# Patient Record
Sex: FEMALE | Race: BLACK OR AFRICAN AMERICAN | NOT HISPANIC OR LATINO | Employment: FULL TIME | URBAN - METROPOLITAN AREA
[De-identification: names, ages, dates, MRNs, and addresses within clinical notes are randomized per-mention and may not be internally consistent; named-entity substitution may affect disease eponyms.]

---

## 2023-11-22 ENCOUNTER — TRANSFERRED RECORDS (OUTPATIENT)
Dept: HEALTH INFORMATION MANAGEMENT | Facility: CLINIC | Age: 38
End: 2023-11-22

## 2023-11-24 ENCOUNTER — TRANSCRIBE ORDERS (OUTPATIENT)
Dept: OTHER | Age: 38
End: 2023-11-24

## 2023-11-24 DIAGNOSIS — H00.11 CHALAZION OF RIGHT UPPER EYELID: Primary | ICD-10-CM

## 2024-02-26 ENCOUNTER — TELEPHONE (OUTPATIENT)
Dept: OPHTHALMOLOGY | Facility: CLINIC | Age: 39
End: 2024-02-26
Payer: COMMERCIAL

## 2024-02-26 NOTE — TELEPHONE ENCOUNTER
FUTURE VISIT INFORMATION      FUTURE VISIT INFORMATION:  Date: 3/4/24  Time: 8:45am  [facility]: [facility]  REFERRAL INFORMATION:  Referring provider:  Nohemi Tinoco OD   Referring providers clinic:  [facility]   Reason for visit/diagnosis  Chalazion of right upper     RECORDS REQUESTED FROM:       Clinic name Comments Records Status Imaging Status   [facility] Request for recs sent 2/26- resent 3/1, 3/4- received and sent to scanning and emailed to [employer]

## 2024-02-29 ENCOUNTER — TELEPHONE (OUTPATIENT)
Dept: OPHTHALMOLOGY | Facility: CLINIC | Age: 39
End: 2024-02-29
Payer: COMMERCIAL

## 2024-03-05 ENCOUNTER — PRE VISIT (OUTPATIENT)
Dept: OPHTHALMOLOGY | Facility: CLINIC | Age: 39
End: 2024-03-05

## 2024-03-05 ENCOUNTER — OFFICE VISIT (OUTPATIENT)
Dept: OPHTHALMOLOGY | Facility: CLINIC | Age: 39
End: 2024-03-05
Attending: OPTOMETRIST
Payer: COMMERCIAL

## 2024-03-05 DIAGNOSIS — H00.11 CHALAZION OF RIGHT UPPER EYELID: ICD-10-CM

## 2024-03-05 PROCEDURE — 67800 REMOVE EYELID LESION: CPT | Mod: E3 | Performed by: OPHTHALMOLOGY

## 2024-03-05 PROCEDURE — 99207 PR BUNDLED PROCEDURE IN GLOBAL PKG: CPT | Performed by: OPHTHALMOLOGY

## 2024-03-05 RX ORDER — ERYTHROMYCIN 5 MG/G
OINTMENT OPHTHALMIC ONCE
Status: COMPLETED | OUTPATIENT
Start: 2024-03-05 | End: 2024-03-05

## 2024-03-05 RX ADMIN — ERYTHROMYCIN 1 G: 5 OINTMENT OPHTHALMIC at 09:52

## 2024-03-05 ASSESSMENT — CONF VISUAL FIELD
OD_NORMAL: 1
OD_SUPERIOR_TEMPORAL_RESTRICTION: 0
OD_INFERIOR_TEMPORAL_RESTRICTION: 0
OS_NORMAL: 1
OD_SUPERIOR_NASAL_RESTRICTION: 0
OS_INFERIOR_TEMPORAL_RESTRICTION: 0
OS_SUPERIOR_TEMPORAL_RESTRICTION: 0
OS_SUPERIOR_NASAL_RESTRICTION: 0
METHOD: COUNTING FINGERS
OD_INFERIOR_NASAL_RESTRICTION: 0
OS_INFERIOR_NASAL_RESTRICTION: 0

## 2024-03-05 ASSESSMENT — REFRACTION_WEARINGRX
OD_SPHERE: -6.00
SPECS_TYPE: SVL
OS_SPHERE: -2.75
OD_AXIS: 069
OS_AXIS: 094
OD_CYLINDER: +2.00
OS_CYLINDER: +1.25

## 2024-03-05 ASSESSMENT — TONOMETRY
IOP_METHOD: ICARE
OD_IOP_MMHG: 13
OS_IOP_MMHG: 12

## 2024-03-05 ASSESSMENT — VISUAL ACUITY
OD_CC+: -2
CORRECTION_TYPE: GLASSES
METHOD: SNELLEN - LINEAR
OD_CC: 20/40
OS_CC: 20/20

## 2024-03-05 ASSESSMENT — EXTERNAL EXAM - RIGHT EYE: OD_EXAM: NORMAL

## 2024-03-05 ASSESSMENT — SLIT LAMP EXAM - LIDS: COMMENTS: NORMAL

## 2024-03-05 ASSESSMENT — EXTERNAL EXAM - LEFT EYE: OS_EXAM: NORMAL

## 2024-03-05 NOTE — NURSING NOTE
Chief Complaints and History of Present Illnesses   Patient presents with    Chalazion Evaluation     Chief Complaint(s) and History of Present Illness(es)       Chalazion Evaluation              Laterality: right upper lid    Onset: 7 months ago    Course: stable    Associated symptoms: lid swelling.  Negative for red eye and eye pain    Treatments tried: warm compresses    Response to treatment: no improvement    Pain scale: 0/10              Comments    Patient has had chalazion right upper lid.  Warm compresses have not provided any benefit.      Danya Zaragoza on 3/5/2024 at 8:39 AM

## 2024-03-05 NOTE — PROGRESS NOTES
Oculoplastic Clinic New Patient    Patient: Malinda Luna MRN# [id number]   YOB: 1985 Age: 38 year old   Date of Visit: Mar 5, 2024         CC: Eyelid lesion    Chief Complaint(s) and History of Present Illness(es)     Chalazion Evaluation            Laterality: right upper lid    Onset: 7 months ago    Course: stable    Associated symptoms: lid swelling.  Negative for red eye and eye pain    Treatments tried: warm compresses    Response to treatment: no improvement    Pain scale: 0/10          Comments    Malinda Luna is being seen for a consult today by the request of Dr. Tinoco for chalazion chalazion right upper lid.  Warm compresses have not   provided any benefit.      Danya Zaragoza on 3/5/2024 at 8:39 AM            HPI:     Malinda Luna is a 38 year old female who has noted a lesion on the right upper eyelid. It has been present for 7 months. This has not been biopsied.    Enlarging: Yes  Irritating to eyelashes and catches in folds of skin: Yes  Bleeding: No  Prior cutaneous malignancy: No  Immunosuppression: No           Assessment and Plan:   1. right upper eyelid lesion - chalazion    She would like it drained, r/b/a to I&D discussed.        Ash Amador MD  Resident Physician, PGY-2  Department of Ophthalmology      Attending Physician Attestation: Complete documentation of historical and exam elements from today's encounter can be found in the full encounter summary report (not reduplicated in this progress note). I personally obtained the chief complaint(s) and history of present illness. I confirmed and edited as necessary the review of systems, past medical/surgical history, family history, social history, and examination findings as documented by others; and I examined the patient myself. I personally reviewed the relevant tests, images, and reports as documented above. I formulated and edited as necessary the assessment and plan and discussed the findings and  management plan with the patient. Delfina Elena MD    OPERATIVE NOTE: CHALAZION.    PRE-OPERATIVE DIAGNOSIS: Chalazion right upper lid.    POST-OPERATIVE DIAGNOSIS: Chalazion right upper lid.    PROCEDURE PERFORMED: Incision and drainage of chalazion right upper lid.    SURGEON: Delfina Elena    ASSISTANT: Beau Amador MD     ANESTHESIA: Local infiltration with 2% lidocaine with epinephrine.    COMPLICATIONS: None    ESTIMATED BLOOD LOSS:  <5mL    HISTORY AND INDICATIONS: Patient presented with an enlarging chalazion in the involved eyelid.  This failed conservative medical management.  After the risks, benefits and alternatives of the proposed procedure were explained, informed consent was obtained.     PROCEDURE: Patient was brought to the minor procedure room and placed supine on the operating table.  Anesthesia was as listed above.  The area was prepped and draped in the typical fashion.  A chalazion clamp was placed over the involved eyelid and the eyelid everted.  A crutiate incision was made over the chalazion and the lipogranulomatous material removed using the chalazion curette.  Scissors were used to break any septae.  The edges of the cruciate incision were excised using Corry scissors.  Hemostasis was obtained.  The lid was reverted to its normal position, the clamp removed, and the erythromycin antibiotic ointment applied.  The patient tolerated the procedure well and left in stable condition with a tube of antibiotic ointment.     I was present for the entire procedure. Delfina Elena MD

## 2024-03-05 NOTE — LETTER
3/5/2024         RE:  :  MRN: Malinda Luna  1985  [id number]     Dear Dr. Tinoco,    Thank you for asking me to see your patient, Malinda Luna, for an oculoplastic   consultation.  My assessment and plan are below.  For further details, please see my attached clinic note.      CC: Eyelid lesion    Chief Complaint(s) and History of Present Illness(es)     Chalazion Evaluation            Laterality: right upper lid    Onset: 7 months ago    Course: stable    Associated symptoms: lid swelling.  Negative for red eye and eye pain    Treatments tried: warm compresses    Response to treatment: no improvement    Pain scale: 0/10          Comments    Malinda Luna is being seen for a consult today by the request of Dr. Tinoco for chalazion chalazion right upper lid.  Warm compresses have not   provided any benefit.      Danya Zaragoza on 3/5/2024 at 8:39 AM            HPI:     Malinda Luna is a 38 year old female who has noted a lesion on the right upper eyelid. It has been present for 7 months. This has not been biopsied.    Enlarging: Yes  Irritating to eyelashes and catches in folds of skin: Yes  Bleeding: No  Prior cutaneous malignancy: No  Immunosuppression: No           Assessment and Plan:   1. right upper eyelid lesion - chalazion    She would like it drained, r/b/a to I&D discussed.       Ash Amador MD  Resident Physician, PGY-2  Department of Ophthalmology      Attending Physician Attestation: Complete documentation of historical and exam elements from today's encounter can be found in the full encounter summary report (not reduplicated in this progress note). I personally obtained the chief complaint(s) and history of present illness. I confirmed and edited as necessary the review of systems, past medical/surgical history, family history, social history, and examination findings as documented by others; and I examined the patient myself. I personally reviewed the relevant tests,  images, and reports as documented above. I formulated and edited as necessary the assessment and plan and discussed the findings and management plan with the patient. Delfina Elena MD    OPERATIVE NOTE: CHALAZION.    PRE-OPERATIVE DIAGNOSIS: Chalazion right upper lid.    POST-OPERATIVE DIAGNOSIS: Chalazion right upper lid.    PROCEDURE PERFORMED: Incision and drainage of chalazion right upper lid.    SURGEON: Delfina Elena    ASSISTANT: Beau Amador MD     ANESTHESIA: Local infiltration with 2% lidocaine with epinephrine.    COMPLICATIONS: None    ESTIMATED BLOOD LOSS:  <5mL    HISTORY AND INDICATIONS: Patient presented with an enlarging chalazion in the involved eyelid.  This failed conservative medical management.  After the risks, benefits and alternatives of the proposed procedure were explained, informed consent was obtained.     PROCEDURE: Patient was brought to the minor procedure room and placed supine on the operating table.  Anesthesia was as listed above.  The area was prepped and draped in the typical fashion.  A chalazion clamp was placed over the involved eyelid and the eyelid everted.  A crutiate incision was made over the chalazion and the lipogranulomatous material removed using the chalazion curette.  Scissors were used to break any septae.  The edges of the cruciate incision were excised using Corry scissors.  Hemostasis was obtained.  The lid was reverted to its normal position, the clamp removed, and the erythromycin antibiotic ointment applied.  The patient tolerated the procedure well and left in stable condition with a tube of antibiotic ointment.     I was present for the entire procedure. Delfina Elena MD     Again, thank you for allowing me to participate in the care of your patient.      Sincerely,    Delfina Elena MD  Department of Ophthalmology and Visual Neurosciences  [facility]    CC: Nohemi Tinoco, [facility]  [address] [place] [address]  [place] [id number]  Via Fax: [phone]

## (undated) RX ORDER — ERYTHROMYCIN 5 MG/G
OINTMENT OPHTHALMIC
Status: DISPENSED
Start: 2024-03-05